# Patient Record
(demographics unavailable — no encounter records)

---

## 2025-04-02 NOTE — HISTORY OF PRESENT ILLNESS
[de-identified] : The patient comes in today for his left foot.  He states that he was with his friends and a 228-pound kid stepped on his foot and he now has significant pain into the area of the left foot.  The patient states the onset/injury occurred on 03/21/25.  This injury is not work related or due to an automobile accident.  He is an avid  and  and now cannot even walk on the foot without any pain.  He walks on his heel.   [de-identified] : Walking [de-identified] : None

## 2025-04-02 NOTE — PHYSICAL EXAM
[de-identified] : Right Foot: Range of Motion in Degrees:                                         Claimant:                  Normal: Dorsiflexion (Active)      40-degrees              40-degrees Dorsiflexion (Passive)   40-degrees              40-degrees Plantar (Ankle)              40-degrees              40-degrees Plantar (Passive)          40-degrees              40-degrees Inversion (Active)          30-degrees              30-degrees Inversion (Passive)       30-degrees              30-degrees Eversion (Active)          20-degrees              20-degrees Eversion (Passive)       20-degrees              20-degrees   No instability.  No tenderness over the anterolateral ligaments.  No medial-sided tenderness.  No hind, mid or forefoot tenderness.  No proximal fibula tenderness.  Neurologically and vascularly intact distally.  Normal dorsi, plantar flexion, inversion of the foot.  No motor, sensory, reflex or vascular deficits.  2+ DP and PT pulses.  Skin is intact.  No rashes, scars or lesions.   Left Foot:  Limited range of motion secondary to the fracture.  He is significantly tender over the cuboid between the first and second metatarsal.  No ecchymosis.  He does have minimal swelling.  No calf tenderness.  Good distal pulses.    [de-identified] : The patient ambulates with an antalgic gait pattern.   [de-identified] : Appearance:  Well-developed, well-nourished male in no acute distress.   [de-identified] : Radiographs, two views of the left foot taken in the office today, reveal no obvious acute fracture.

## 2025-04-02 NOTE — DISCUSSION/SUMMARY
[de-identified] : At this time, the patient is going to get an MRI to rule out possible occult fracture of left foot.  He will be out of all sports and activities, and then we will do a TTM to review the MRI.

## 2025-04-02 NOTE — ADDENDUM
[FreeTextEntry1] : This note was written by Uziel Darnell on 04/02/2025, acting as a scribe for EDMOND ENRIQUEZ, ASIA/L, PA 
PAST SURGICAL HISTORY:  No significant past surgical history

## 2025-06-19 NOTE — ASSESSMENT
[FreeTextEntry1] : The patient is 2 weeks s/p a right ankle ORIF on 6/3/25. The patient denies fever, chills, CP, SOB. The patient denies drainage or discharge from their incision. The patient is doing well postoperatively. The patient has not needed pain medications and is taking tylenol as needed with pain control. He denies new trauma. He denies paresthesias. He presents on crutches and in splint. He is doing well overall.  Right ankle exam: The patient is in no apparent distress. Neurovascularly intact. Sensation to right lower extremity. 2+ pulses to posterior tibialis. Operative incision is clean, dry, and intact. Staples removed. Splint removed. Steris placed. No active drainage or discharge. No signs of infection or DVT. Patient can gently dorsiflex and plantarflex with soreness. He can wiggle toes without issue.  X-rays done in the office today of the right ankle 3 views show the fracture to be healing in anatomic position with appropriate position of the hardware.  There is no medial clear space widening and no separation of the syndesmosis. There are no obvious tumors, mass or calcifications seen.  The plan at this time is a cam walker boot.  He will be nonweightbearing for the next 2 weeks and then he can start progressive weightbearing.  Will start him in physical therapy right away to start range of motion and strengthening exercises.  I will see him back in the office in 4 weeks with x-rays 3 views of the right ankle.

## 2025-06-19 NOTE — REASON FOR VISIT
Assessment & Plan   (M54.50) Midline low back pain without sciatica, unspecified chronicity  (primary encounter diagnosis)  (M48.07) Lumbosacral spinal stenosis  Comment: See pt instructions and epic orders.   Plan: Spine  Referral          (E11.29,  R80.9) Type 2 diabetes mellitus with microalbuminuria, without long-term current use of insulin (H)  Comment: Future lab orders entered.   Plan: Hemoglobin A1c          (E78.5) Hyperlipidemia with target LDL less than 100  Comment: Future lab orders entered.   Plan: Lipid panel reflex to direct LDL Fasting,         **Comprehensive metabolic panel FUTURE 2mo             Patient Instructions   Someone should be contacting you to help get you in to see the Spine specialist again ASAP.    Okay to take hydrocodone tablets, maybe one at bedtime when not able to sleep. Otherwise try to avoid and instead:  Use ice, plain Tylenol. (Each hydrocodone tablet contains a regular strength Tylenol).     Try to schedule an appointment with me in January 2023 for next diabetes checkup. Okay to schedule a lab appointment a few days before the office appointment.       Return in about 3 months (around 1/12/2023).    Viet Bingham MD,   [facility] JAYLAN Rosenbaum is a 88 year old accompanied by his spouse, presenting for the following health issues:  ER F/U      HPI       ED/UC Followup:    Facility:  [facility]  Date of visit: 10/10/2022  Reason for visit: right hip pain  Current Status: now has lower back pain.     He has been bothered by persistent back pain.   He has seen [facility] Jayesh Barker [facility] on 5/24.    We reviewed recent urgent care visit on 10/10 with right hip pain. X-ray showed no fracture. He was provided an Rx for Vicodin.     Since that time the hip pain has improved, but he is bothered by pain in his back, pointing to the midline around the mid-lumbar region.     He wants to know if he  "has wax in his ears. He is about to have his hearing checked in late October.     Past medical, family and social histories as well as medications reviewed and updated as needed.    Review of Systems   REVIEW OF SYSTEMS: The following systems have been completely reviewed and are negative except as noted above:   Constitutional, HEENT, musculoskeletal, and neurologic systems.        Objective    /62   Pulse 77   Temp 98.1  F (36.7  C) (Oral)   Resp 18   Ht 1.588 m (5' 2.5\")   Wt 81.6 kg (180 lb)   SpO2 96%   BMI 32.40 kg/m    Body mass index is 32.4 kg/m .     Physical Exam   GENERAL: healthy, alert and no distress  HENT: ear canals and TM's normal  RESP: lungs clear to auscultation - no rales, rhonchi or wheezes  CV: regular rate and rhythm, normal S1 S2, no S3 or S4, no murmur, click or rub, no peripheral edema and peripheral pulses strong  MS: no gross musculoskeletal defects noted, no edema  NEURO: Normal strength and tone, mentation intact and speech normal  BACK: no CVA tenderness, no paralumbar tenderness              " [FreeTextEntry2] : PO #1  RIGHT ANKLE FRACTURE DOS-6/03/25

## 2025-07-16 NOTE — ASSESSMENT
[FreeTextEntry1] : The patient comes in today for follow-up on his right ankle.  He is now 6 and half weeks out from his open reduction and internal fixation of a right trimalleolar ankle fracture particularly involving the lateral malleolus and requiring the use of a tight rope to reduce the deltoid ligament.  The surgery was on Vania 3, 2025.  He is now walking in a short cam walker boot.  He has minimal pain although he does have some soreness and swelling along the medial side.  Examination of the right lower extremity reveals a normal neurovascular exam.  Examination of the right ankle reveals well-healed scars.  There is mild diffuse point tenderness particularly over the medial malleolus.  He has full active and passive range of motion in all directions of the ankle and subtalar joint.  There is no instability anterior drawer or talar tilt.  He has decent strength but has difficulty doing a double leg toe raise.  X-rays done in the office today of the right ankle 3 views show the fracture to be healing in anatomic position with appropriate position of the hardware.  There is no widening of the syndesmosis or medial clear space.  There are no obvious tumors, masses or calcifications seen.  The plan at this time is to continue physical therapy.  He will wean out of the cam walker boot and get into a lace up orthosis.  I will see him back in the office in about 6 weeks for reevaluation and clearance.  I do not think he is quite ready to get on ice skates yet.